# Patient Record
Sex: MALE | Race: WHITE | HISPANIC OR LATINO | URBAN - METROPOLITAN AREA
[De-identification: names, ages, dates, MRNs, and addresses within clinical notes are randomized per-mention and may not be internally consistent; named-entity substitution may affect disease eponyms.]

---

## 2018-06-19 ENCOUNTER — EMERGENCY (EMERGENCY)
Facility: HOSPITAL | Age: 18
LOS: 1 days | Discharge: HOME | End: 2018-06-19
Attending: EMERGENCY MEDICINE
Payer: COMMERCIAL

## 2018-06-19 VITALS
DIASTOLIC BLOOD PRESSURE: 77 MMHG | TEMPERATURE: 98 F | SYSTOLIC BLOOD PRESSURE: 116 MMHG | HEART RATE: 79 BPM | OXYGEN SATURATION: 98 % | RESPIRATION RATE: 18 BRPM

## 2018-06-19 VITALS — HEIGHT: 73 IN | WEIGHT: 160.06 LBS

## 2018-06-19 PROCEDURE — 99283 EMERGENCY DEPT VISIT LOW MDM: CPT

## 2018-06-19 PROCEDURE — 73562 X-RAY EXAM OF KNEE 3: CPT

## 2018-06-19 PROCEDURE — 73562 X-RAY EXAM OF KNEE 3: CPT | Mod: 26,RT

## 2018-06-19 RX ORDER — IBUPROFEN 200 MG
400 TABLET ORAL ONCE
Qty: 0 | Refills: 0 | Status: COMPLETED | OUTPATIENT
Start: 2018-06-19 | End: 2018-06-19

## 2018-06-19 RX ORDER — CYCLOBENZAPRINE HYDROCHLORIDE 10 MG/1
5 TABLET, FILM COATED ORAL ONCE
Qty: 0 | Refills: 0 | Status: COMPLETED | OUTPATIENT
Start: 2018-06-19 | End: 2018-06-19

## 2018-06-19 RX ORDER — IBUPROFEN 200 MG
1 TABLET ORAL
Qty: 20 | Refills: 0
Start: 2018-06-19 | End: 2018-06-23

## 2018-06-19 RX ADMIN — Medication 400 MILLIGRAM(S): at 09:31

## 2018-06-19 RX ADMIN — CYCLOBENZAPRINE HYDROCHLORIDE 5 MILLIGRAM(S): 10 TABLET, FILM COATED ORAL at 09:30

## 2018-06-19 NOTE — ED ADULT NURSE NOTE - OBJECTIVE STATEMENT
Patient arrived to ED today with c/o right knee pain after his knee was twisted yesterday by a friend.

## 2018-06-19 NOTE — ED STATDOCS - OBJECTIVE STATEMENT
USOH until yesterday when he was wrestling with a friend in a pool and internally twisted at his right knee, notes pain and swelling s/p event, states that this is his only injury, has no other complaints.  Able to walk but increase pain on weight bearing.    PMH/PSH = denies  ALL = NKDA  MEDS = denies  SH = denies    Did not self medicate with pain meds PTA.

## 2018-06-19 NOTE — ED ADULT TRIAGE NOTE - CHIEF COMPLAINT QUOTE
"I was playing in the pool yesterday and I think I popped my right knee. "Pt ambulatory, no obvious signs of trauma.

## 2018-06-19 NOTE — ED STATDOCS - DIAGNOSTIC INTERPRETATION
prelim read, official to follow, will be called for a variance, ace wrap placed on knee, relief with pain meds, discharged on same, f/u as instructed.

## 2019-08-14 ENCOUNTER — EMERGENCY (EMERGENCY)
Facility: HOSPITAL | Age: 19
LOS: 1 days | Discharge: HOME | End: 2019-08-14
Attending: EMERGENCY MEDICINE
Payer: MEDICAID

## 2019-08-14 VITALS
OXYGEN SATURATION: 97 % | SYSTOLIC BLOOD PRESSURE: 108 MMHG | RESPIRATION RATE: 20 BRPM | TEMPERATURE: 98 F | DIASTOLIC BLOOD PRESSURE: 54 MMHG | HEART RATE: 105 BPM

## 2019-08-14 PROCEDURE — 96375 TX/PRO/DX INJ NEW DRUG ADDON: CPT

## 2019-08-14 PROCEDURE — 99284 EMERGENCY DEPT VISIT MOD MDM: CPT | Mod: 25

## 2019-08-14 PROCEDURE — 99284 EMERGENCY DEPT VISIT MOD MDM: CPT

## 2019-08-14 PROCEDURE — 96374 THER/PROPH/DIAG INJ IV PUSH: CPT

## 2019-08-14 RX ORDER — SODIUM CHLORIDE 9 MG/ML
1000 INJECTION INTRAMUSCULAR; INTRAVENOUS; SUBCUTANEOUS ONCE
Refills: 0 | Status: COMPLETED | OUTPATIENT
Start: 2019-08-14 | End: 2019-08-14

## 2019-08-14 RX ORDER — FAMOTIDINE 10 MG/ML
20 INJECTION INTRAVENOUS ONCE
Refills: 0 | Status: COMPLETED | OUTPATIENT
Start: 2019-08-14 | End: 2019-08-14

## 2019-08-14 RX ORDER — DIPHENHYDRAMINE HCL 50 MG
50 CAPSULE ORAL ONCE
Refills: 0 | Status: COMPLETED | OUTPATIENT
Start: 2019-08-14 | End: 2019-08-14

## 2019-08-14 RX ORDER — DIPHENHYDRAMINE HCL 50 MG
1 CAPSULE ORAL
Qty: 20 | Refills: 0
Start: 2019-08-14 | End: 2019-08-18

## 2019-08-14 RX ORDER — FAMOTIDINE 10 MG/ML
1 INJECTION INTRAVENOUS
Qty: 10 | Refills: 0
Start: 2019-08-14 | End: 2019-08-18

## 2019-08-14 RX ORDER — EPINEPHRINE 0.3 MG/.3ML
0.3 INJECTION INTRAMUSCULAR; SUBCUTANEOUS
Qty: 1 | Refills: 0
Start: 2019-08-14 | End: 2019-08-14

## 2019-08-14 RX ADMIN — FAMOTIDINE 20 MILLIGRAM(S): 10 INJECTION INTRAVENOUS at 15:02

## 2019-08-14 RX ADMIN — SODIUM CHLORIDE 1000 MILLILITER(S): 9 INJECTION INTRAMUSCULAR; INTRAVENOUS; SUBCUTANEOUS at 15:02

## 2019-08-14 RX ADMIN — Medication 125 MILLIGRAM(S): at 15:02

## 2019-08-14 RX ADMIN — Medication 50 MILLIGRAM(S): at 15:02

## 2019-08-14 NOTE — ED STATDOCS - PHYSICAL EXAMINATION
Gen: NAD, AOx3  Head: NCAT  HEENT: PERRL, EOMI, No oral angio swelling. No strider   Lung: CTAB, no respiratory distress  CV: rrr, no murmur, Normal perfusion  Abd: soft, NTND, no CVA tenderness  MSK: No edema, no visible deformities  Neuro: No focal neurologic deficits  Skin: Diffuse urticaria   Psych: normal affect Gen: NAD, AOx3  Head: NCAT  HEENT: EOMI, No oropharngial swelling. No stridor   Lung: CTAB, no respiratory distress  CV: rrr, no murmur, Normal perfusion  Abd: soft, NTND  MSK: No edema, no visible deformities  Neuro: No focal neurologic deficits  Skin: Diffuse urticaria   Psych: normal affect

## 2019-08-14 NOTE — ED ADULT TRIAGE NOTE - CHIEF COMPLAINT QUOTE
Patient states that he was stung by a bee 20 mins ago, patient has hives and SOB noted at this time.

## 2019-08-14 NOTE — ED STATDOCS - NS ED ROS FT
ROS: no CP/SOB. no cough. no fever. no v/d/c. no abd pain. no rash. no bleeding. no urinary complaints. no weakness. no vision changes. no HA. no neck/back pain. no extremity swelling/deformity. No change in mental status. +hives, nausea ROS: no CP +SOB. no cough. no fever. no v/d/c. no abd pain. +rash. no bleeding. no urinary complaints. no weakness. no vision changes. no HA. no neck/back pain. no extremity swelling/deformity. No change in mental status.

## 2019-08-14 NOTE — ED STATDOCS - OBJECTIVE STATEMENT
19y/o M presents to the ED c/o worsening allergic reaction s/p bee sting while at work. Hives present on b/l UE. Assoc. sx of nausea and mild SOB, denies vomiting or throat closing. 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 17y/o M presents to the ED c/o worsening allergic reaction s/p bee sting while at work. Hives present on b/l UE. Assoc. sx of nausea no vomiting/diarrhea and mild SOB, denies vomiting or throat closing. 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

## 2019-08-14 NOTE — ED STATDOCS - ATTENDING CONTRIBUTION TO CARE
I, Jailyn Elder, performed the initial face to face bedside interview with this patient regarding history of present illness, review of symptoms and relevant past medical, social and family history.  I completed an independent physical examination.   The medical decision making and follow-up on ordered tests (ie labs, radiologic studies) and re-evaluation of the patient's status has been communicated to the ACP.  Disposition of the patient will be based on test outcome and response to ED interventions.  The history, relevant review of systems, past medical and surgical history, medical decision making, and physical examination was documented by the scribe in my presence and I attest to the accuracy of the documentation.

## 2019-08-14 NOTE — ED STATDOCS - PROGRESS NOTE DETAILS
NP NOTE:  HPI, ROS, PE of intake doctor reviewed.  Symptoms resolved.  Lungs CTA/BL, hives resolved.  Will d/c home rx benadryl, pepcid, steroids and epipen.

## 2019-08-14 NOTE — ED ADULT NURSE NOTE - OBJECTIVE STATEMENT
18 yom presents to ed allergic to bees, stung on left leg by bee anaphylactic reaction. airway patent,  lung sounds upper and lower present. hives and itchy.

## 2019-08-14 NOTE — ED STATDOCS - CLINICAL SUMMARY MEDICAL DECISION MAKING FREE TEXT BOX
Pt with allergic reaction, currently not fitting aphylaxis criteria. Benadryl, fluids, Peptid reassess. Pt with allergic reaction, currently not fitting anaphylaxis criteria. Benadryl, steroids, Peptid fluids reassess.